# Patient Record
Sex: FEMALE | Race: WHITE | Employment: FULL TIME
[De-identification: names, ages, dates, MRNs, and addresses within clinical notes are randomized per-mention and may not be internally consistent; named-entity substitution may affect disease eponyms.]

---

## 2020-07-20 ENCOUNTER — NURSE TRIAGE (OUTPATIENT)
Dept: OTHER | Facility: CLINIC | Age: 25
End: 2020-07-20

## 2020-07-20 NOTE — TELEPHONE ENCOUNTER
Abdominal pain if she presses on the area. It is only with palpation that she feels the sensitivity. She noticed it last night when she was leaning against the sink while brushing her teeth. It is just to the right and up a little of the belly button. Reason for Disposition   Patient wants to be seen    Answer Assessment - Initial Assessment Questions  1. LOCATION: \"Where does it hurt? \"       Abdominal pain if she presses on the area. It is only with palpation that she feels the sensitivity. She noticed it last night when she was leaning against the sink while brushing her teeth. It is just to the right and up a little of the belly button. Still able to eat, drink and do daily activities. No change in BM. 2. RADIATION: \"Does the pain shoot anywhere else? \" (e.g., chest, back)      Denies    3. ONSET: \"When did the pain begin? \" (e.g., minutes, hours or days ago)       Noticed it last night    4. SUDDEN: \"Gradual or sudden onset?\"        5. PATTERN \"Does the pain come and go, or is it constant? \"     - If constant: \"Is it getting better, staying the same, or worsening? \"       (Note: Constant means the pain never goes away completely; most serious pain is constant and it progresses)      - If intermittent: \"How long does it last?\" \"Do you have pain now? \"      (Note: Intermittent means the pain goes away completely between bouts)      Only on palpation     6. SEVERITY: \"How bad is the pain? \"  (e.g., Scale 1-10; mild, moderate, or severe)     - MILD (1-3): doesn't interfere with normal activities, abdomen soft and not tender to touch      - MODERATE (4-7): interferes with normal activities or awakens from sleep, tender to touch      - SEVERE (8-10): excruciating pain, doubled over, unable to do any normal activities        5/10 on palpation only    7. RECURRENT SYMPTOM: \"Have you ever had this type of abdominal pain before? \" If so, ask: \"When was the last time? \" and \"What happened that time? \"       No    8. AGGRAVATING FACTORS: \"Does anything seem to cause this pain? \" (e.g., foods, stress, alcohol)      Touching it    9. CARDIAC SYMPTOMS: \"Do you have any of the following symptoms: chest pain, difficulty breathing, sweating, nausea? \"      Denies    10. OTHER SYMPTOMS: \"Do you have any other symptoms? \" (e.g., fever, vomiting, diarrhea)        Denies    11. PREGNANCY: \"Is there any chance you are pregnant? \" \"When was your last menstrual period? \"        No    Protocols used: ABDOMINAL PAIN - UPPER-ADULT-OH    Pod 1    Was calling to get set up/ established with a doctor or NP in the office. Received call from [facility]. Attempted soft transferred to [facility] to schedule appointment. Message sent via message board. Please do not reply to the triage nurse through this encounter. Any subsequent communication should be directly with the patient.

## 2020-07-27 ENCOUNTER — OFFICE VISIT (OUTPATIENT)
Dept: PRIMARY CARE CLINIC | Age: 25
End: 2020-07-27
Payer: COMMERCIAL

## 2020-07-27 VITALS
DIASTOLIC BLOOD PRESSURE: 76 MMHG | OXYGEN SATURATION: 97 % | HEIGHT: 63 IN | TEMPERATURE: 97.9 F | BODY MASS INDEX: 41.25 KG/M2 | HEART RATE: 94 BPM | SYSTOLIC BLOOD PRESSURE: 118 MMHG | WEIGHT: 232.8 LBS

## 2020-07-27 PROCEDURE — 90472 IMMUNIZATION ADMIN EACH ADD: CPT | Performed by: PHYSICIAN ASSISTANT

## 2020-07-27 PROCEDURE — G8417 CALC BMI ABV UP PARAM F/U: HCPCS | Performed by: PHYSICIAN ASSISTANT

## 2020-07-27 PROCEDURE — 99203 OFFICE O/P NEW LOW 30 MIN: CPT | Performed by: PHYSICIAN ASSISTANT

## 2020-07-27 PROCEDURE — 1036F TOBACCO NON-USER: CPT | Performed by: PHYSICIAN ASSISTANT

## 2020-07-27 PROCEDURE — 90471 IMMUNIZATION ADMIN: CPT | Performed by: PHYSICIAN ASSISTANT

## 2020-07-27 PROCEDURE — G8427 DOCREV CUR MEDS BY ELIG CLIN: HCPCS | Performed by: PHYSICIAN ASSISTANT

## 2020-07-27 PROCEDURE — 90715 TDAP VACCINE 7 YRS/> IM: CPT | Performed by: PHYSICIAN ASSISTANT

## 2020-07-27 PROCEDURE — 90651 9VHPV VACCINE 2/3 DOSE IM: CPT | Performed by: PHYSICIAN ASSISTANT

## 2020-07-27 RX ORDER — SUMATRIPTAN 25 MG/1
25 TABLET, FILM COATED ORAL
Qty: 9 TABLET | Refills: 3 | Status: SHIPPED | OUTPATIENT
Start: 2020-07-27 | End: 2020-08-31

## 2020-07-27 SDOH — HEALTH STABILITY: MENTAL HEALTH: HOW OFTEN DO YOU HAVE A DRINK CONTAINING ALCOHOL?: NEVER

## 2020-07-27 ASSESSMENT — ENCOUNTER SYMPTOMS: SHORTNESS OF BREATH: 0

## 2020-07-27 NOTE — PROGRESS NOTES
[facility] PRIMARY CARE  [address] [place] [address]  Dept: [phone]    Gagan Wilson is a 22 y.o. female who presents today as an new patient for her medical conditionsas noted below. Chief Complaint   Patient presents with    New Patient     get established.  Mass     Pt feels a lump on Right lower abdomen - does have pain with palpation    Headache     Pt gets headaches - feels pain and pressure on and around nose area - reports headaches 3-4 times a week. HPI:     HPI   Has not had a  PCP; says her sister is a doctor. Denies chronic medical problems. HM:  -Pt reports she has had 1 normal pap smear previously in 2018 with [facility]. Been on OCP since 2017. Does not usually have a periods, but when she has them they are heavy. Not spotting between them. Sexually active, but pt is engaged and denies concern for STDs.   -Says she had her childhood vaccines, but did not have Gardasil and she is interested in receiving that today. Noticed lump in right abdomen last Saturday when she leaned against the sink. Hurts when she pushes on it and she feels a stinging sensation afterwards. Occasional sharp pain briefly even when she does not push on it. No N/V. No Diarrhea. No constipation-BM daily. No blood in stool. No hx of lipomas. No fever. Had HAs for years, but they are getting more frequent the last couple months (3-4x per week). Occur around the bridge of her nose. Denies any nasal congestion. Says twice she has had an aura with the HAs- saw a blurry Jackson in her vision prior to the HA starting. Majority of the HAs are located on the left vertex. Gets nausea, no vomiting. Light sensitivity, but not sound. No associated weakness, or n/t or speech issues. Sometime they last all day, sometimes over 24 hours. Ibuprofen 800 mg usually helps. Mother has migraines.    Pt does not drink any caffeine regularly. Does not eat meat, does not drink wine. Gets adequate sleep. Does grind her teeth, but wears a mouth guard. +clenches. Work is stressful. Usually does dance for stress relief, but it is not currently open with COVID. No results found for: LDLCHOLESTEROL, LDLCALC    (goal LDL is <100)   No results found for: AST, ALT, BUN  BP Readings from Last 3 Encounters:   07/27/20 118/76          (goal 120/80)    Past Medical History:   Diagnosis Date    Headache       Past Surgical History:   Procedure Laterality Date    WISDOM TOOTH EXTRACTION  2015       Family History   Problem Relation Age of Onset    Diabetes Mother     High Blood Pressure Mother    [facility] Migraines Mother     Diabetes Father     High Blood Pressure Father     Asthma Sister     High Blood Pressure Brother     Breast Cancer Maternal Grandmother     Heart Attack Maternal Grandfather     High Blood Pressure Paternal Grandmother     Diabetes Paternal Grandmother     High Blood Pressure Paternal Grandfather     Diabetes Paternal Grandfather        Social History     Tobacco Use    Smoking status: Never Smoker    Smokeless tobacco: Never Used   Substance Use Topics    Alcohol use: Never     Frequency: Never      Current Outpatient Medications   Medication Sig Dispense Refill    Norethin Ace-Eth Estrad-FE (LEATHA FE 1/20 PO) Take by mouth      SUMAtriptan (IMITREX) 25 MG tablet Take 1 tablet by mouth once as needed for Migraine May repeat once in 2 hours if needed 9 tablet 3     No current facility-administered medications for this visit.       Allergies   Allergen Reactions    Bee Venom Anaphylaxis       Health Maintenance   Topic Date Due    HIV screen  01/13/2010    HPV vaccine (2 - 3-dose series) 08/24/2020    Flu vaccine (1) 09/01/2020    Cervical cancer screen  10/29/2021    DTaP/Tdap/Td vaccine (7 - Td) 07/27/2030    Hepatitis A vaccine  Completed    Hepatitis B vaccine  Completed    Hib vaccine  Completed  Varicella vaccine  Completed    Meningococcal (ACWY) vaccine  Aged Out    Pneumococcal 0-64 years Vaccine  Aged Out       Subjective:     Review of Systems   Constitutional: Negative for fever. HENT: Negative for congestion. Eyes: Positive for photophobia and visual disturbance (twice as aura prior to HA). Respiratory: Negative for shortness of breath. Cardiovascular: Negative for chest pain. Gastrointestinal: Positive for abdominal pain (near mass) and nausea (only with HAs). Negative for blood in stool, constipation, diarrhea and vomiting. Neurological: Positive for light-headedness (with the HAs) and headaches. Negative for speech difficulty, weakness and numbness. Psychiatric/Behavioral: Negative for sleep disturbance. Objective:     /76   Pulse 94   Temp 97.9 °F (36.6 °C)   Ht 5' 3\" (1.6 m)   Wt 232 lb 12.8 oz (105.6 kg)   SpO2 97%   BMI 41.24 kg/m²   Physical Exam  Vitals signs and nursing note reviewed. Constitutional:       Appearance: Normal appearance. HENT:      Head: Normocephalic and atraumatic. Eyes:      Extraocular Movements: Extraocular movements intact. Pupils: Pupils are equal, round, and reactive to light. Cardiovascular:      Rate and Rhythm: Normal rate and regular rhythm. Pulmonary:      Effort: Pulmonary effort is normal.      Breath sounds: Normal breath sounds. Abdominal:      General: Bowel sounds are normal. There is no distension. Palpations: There is mass. Tenderness: There is abdominal tenderness. There is no guarding or rebound. Neurological:      Mental Status: She is alert. Cranial Nerves: No cranial nerve deficit. Assessment:       Diagnosis Orders   1. Right upper quadrant abdominal mass  CT ABDOMEN PELVIS W IV CONTRAST Additional Contrast? Radiologist Recommendation   2. Migraine with aura and without status migrainosus, not intractable  SUMAtriptan (IMITREX) 25 MG tablet   3.  Need for vaccination  HPV Vaccine 9-valent IM    Tdap (age 10y-63y) IM (ADACEL)        Plan:     1. RUQ abdominal mass- Did not seem to reduce with pressure or increase with cough to suggest a hernia. Ordered abdominal CT to characterize the mass. 2. Migraines- Trial of imitrex for migraine . Try to find stress outlet, such as doing her dance routines at home, etc.     3. Given 1st HPV vaccine and Tdap updated today. Return in about 1 month (around 2020) for migraines and 2nd HPV vaccine. Orders Placed This Encounter   Procedures    CT ABDOMEN PELVIS W IV CONTRAST Additional Contrast? Radiologist Recommendation     Standing Status:   Future     Standing Expiration Date:   2021     Order Specific Question:   Additional Contrast?     Answer:   Radiologist Recommendation    HPV Vaccine 9-valent IM     Patient must lay down for 10 minutes after the injection    Tdap (age 10y-63y) IM (ADACEL)     Orders Placed This Encounter   Medications    SUMAtriptan (IMITREX) 25 MG tablet     Sig: Take 1 tablet by mouth once as needed for Migraine May repeat once in 2 hours if needed     Dispense:  9 tablet     Refill:  3       Patient given educationalmaterials - see patient instructions. Discussed use, benefit, and side effectsof prescribed medications. All patient questions answered. Pt voiced understanding. Reviewed health maintenance. Instructed to continue current medications, diet andexercise. Patient agreed with treatment plan. Follow up as directed.      Electronicallysigned by Moraima Rice PA-C on 2020 at 10:39 AM

## 2020-07-27 NOTE — PATIENT INSTRUCTIONS
Patient Education        Recurring Migraine Headache: Care Instructions  Your Care Instructions  Migraines are painful, throbbing headaches. They often start on one side of the head. They may cause nausea and vomiting and make you sensitive to light, sound, or smell. Some people may have only a few migraines throughout life. Others have them as often as several times a month. The goal of treatment is to reduce the number of migraines you have and relieve your symptoms. Even with treatment, you may continue to have migraines. You play an important role in dealing with your headaches. Work on avoiding things that seem to trigger your migraines. When you feel a headache coming on, act quickly to stop it before it gets worse. Follow-up care is a key part of your treatment and safety. Be sure to make and go to all appointments, and call your doctor if you are having problems. It's also a good idea to know your test results and keep a list of the medicines you take. How can you care for yourself at home? · Do not drive if you have taken a prescription pain medicine. · Rest in a quiet, dark room until your headache is gone. Close your eyes and try to relax or go to sleep. Do not watch TV or read. · Put a cold, moist cloth or cold pack on the painful area for 10 to 20 minutes at a time. Put a thin cloth between the cold pack and your skin. · Have someone gently massage your neck and shoulders. · Take your medicines exactly as prescribed. Call your doctor if you think you are having a problem with your medicine. You will get more details on the specific medicines your doctor prescribes. · Don't take medicine for headache pain too often. Talk to your doctor if you are taking medicine more than 2 days a week to stop a headache. Taking too much pain medicine can lead to more headaches. These are called medicine-overuse headaches.   To prevent migraines  · Keep a headache diary so you can figure out what triggers your headaches. Avoiding triggers may help you prevent headaches. Record when each headache began, how long it lasted, and what the pain was like. Write down any other symptoms you had with the headache. These may include nausea, flashing lights or dark spots, or sensitivity to bright light or loud noise. Note if the headache occurred near your period. List anything that might have triggered the headache. Triggers may include certain foods (chocolate, cheese, wine) or odors, smoke, bright light, stress, or lack of sleep. · If your doctor has prescribed medicine for your migraines, take it as directed. You may have medicine that you take only when you get a migraine and medicine that you take all the time to help prevent migraines. ? If your doctor has prescribed medicine for when you get a headache, take it at the first sign of a migraine, unless your doctor has given you other instructions. ? If your doctor has prescribed medicine to prevent migraines, take it exactly as prescribed. Call your doctor if you think you are having a problem with your medicine. · Find healthy ways to deal with stress. Migraines are most common during or right after stressful times. Try finding ways to reduce stress like practicing mindfulness or deep breathing exercises. · Get regular sleep and exercise. But be careful to not push yourself too hard during exercise. It may trigger a headache. · Eat regular meals, and avoid foods and drinks that often trigger migraines. These include chocolate and alcohol, especially red wine and port. Chemicals used in food, such as aspartame and monosodium glutamate (MSG), also can trigger migraines. So can some food additives, such as those found in hot dogs, vega, cold cuts, aged cheeses, and pickled foods. · Limit caffeine by not drinking too much coffee, tea, or soda. Do not quit caffeine suddenly, because that can also give you migraines. · Do not smoke or allow others to smoke around you.  If you need help quitting, talk to your doctor about stop-smoking programs and medicines. These can increase your chances of quitting for good. · If you are taking birth control pills or hormone therapy, talk to your doctor about whether they are triggering your migraines. When should you call for help? [id number] anytime you think you may need emergency care. For example, call if:  · You have symptoms of a stroke. These may include:  ? Sudden numbness, tingling, weakness, or loss of movement in your face, arm, or leg, especially on only one side of your body. ? Sudden vision changes. ? Sudden trouble speaking. ? Sudden confusion or trouble understanding simple statements. ? Sudden problems with walking or balance. ? A sudden, severe headache that is different from past headaches. Call your doctor now or seek immediate medical care if:  · You develop a fever and a stiff neck. · You have new nausea and vomiting, or you cannot keep down food or liquids. Watch closely for changes in your health, and be sure to contact your doctor if:  · You have a headache that does not get better within 1 or 2 days. · Your headaches get worse or happen more often. Where can you learn more? Go to https://[employer] AppspepicOptionEase.Weekdone. org and sign in to your fotobabble account. Enter  in the [employer] box to learn more about \"Recurring Migraine Headache: Care Instructions. \"     If you do not have an account, please click on the \"Sign Up Now\" link. Current as of: November 20, 2019               Content Version: 12.5  © 1123-9658 Healthwise, Incorporated. Care instructions adapted under license by [facility] ([facility]). If you have questions about a medical condition or this instruction, always ask your healthcare professional. [place] [address] any warranty or liability for your use of this information.

## 2020-07-28 ENCOUNTER — APPOINTMENT (OUTPATIENT)
Dept: CT IMAGING | Age: 25
End: 2020-07-28
Payer: COMMERCIAL

## 2020-07-28 ENCOUNTER — HOSPITAL ENCOUNTER (EMERGENCY)
Age: 25
Discharge: HOME | End: 2020-07-28
Attending: EMERGENCY MEDICINE
Payer: COMMERCIAL

## 2020-07-28 VITALS
OXYGEN SATURATION: 96 % | HEIGHT: 63 IN | DIASTOLIC BLOOD PRESSURE: 98 MMHG | WEIGHT: 231 LBS | BODY MASS INDEX: 40.93 KG/M2 | RESPIRATION RATE: 14 BRPM | HEART RATE: 123 BPM | SYSTOLIC BLOOD PRESSURE: 154 MMHG | TEMPERATURE: 98.2 F

## 2020-07-28 LAB
ABSOLUTE EOS #: 0.04 K/UL (ref 0–0.44)
ABSOLUTE IMMATURE GRANULOCYTE: 0.02 K/UL (ref 0–0.3)
ABSOLUTE LYMPH #: 1.86 K/UL (ref 1.1–3.7)
ABSOLUTE MONO #: 0.58 K/UL (ref 0.1–1.2)
ALBUMIN SERPL-MCNC: 4.3 G/DL (ref 3.5–5.2)
ALBUMIN/GLOBULIN RATIO: ABNORMAL (ref 1–2.5)
ALP BLD-CCNC: 75 U/L (ref 35–104)
ALT SERPL-CCNC: 21 U/L (ref 5–33)
ANION GAP SERPL CALCULATED.3IONS-SCNC: 13 MMOL/L (ref 9–17)
AST SERPL-CCNC: 23 U/L
BASOPHILS # BLD: 1 % (ref 0–2)
BASOPHILS ABSOLUTE: 0.03 K/UL (ref 0–0.2)
BILIRUB SERPL-MCNC: 0.22 MG/DL (ref 0.3–1.2)
BUN BLDV-MCNC: 10 MG/DL (ref 6–20)
BUN/CREAT BLD: 12 (ref 9–20)
CALCIUM SERPL-MCNC: 9.2 MG/DL (ref 8.6–10.4)
CHLORIDE BLD-SCNC: 101 MMOL/L (ref 98–107)
CO2: 23 MMOL/L (ref 20–31)
CREAT SERPL-MCNC: 0.81 MG/DL (ref 0.5–0.9)
DIFFERENTIAL TYPE: NORMAL
EOSINOPHILS RELATIVE PERCENT: 1 % (ref 1–4)
GFR AFRICAN AMERICAN: >60 ML/MIN
GFR NON-AFRICAN AMERICAN: >60 ML/MIN
GFR SERPL CREATININE-BSD FRML MDRD: ABNORMAL ML/MIN/{1.73_M2}
GFR SERPL CREATININE-BSD FRML MDRD: ABNORMAL ML/MIN/{1.73_M2}
GLUCOSE BLD-MCNC: 89 MG/DL (ref 70–99)
HCG QUALITATIVE: NEGATIVE
HCT VFR BLD CALC: 39.3 % (ref 36.3–47.1)
HEMOGLOBIN: 13.4 G/DL (ref 11.9–15.1)
IMMATURE GRANULOCYTES: 0 %
LIPASE: 16 U/L (ref 13–60)
LYMPHOCYTES # BLD: 30 % (ref 24–43)
MCH RBC QN AUTO: 29.6 PG (ref 25.2–33.5)
MCHC RBC AUTO-ENTMCNC: 34.1 G/DL (ref 28.4–34.8)
MCV RBC AUTO: 86.9 FL (ref 82.6–102.9)
MONOCYTES # BLD: 9 % (ref 3–12)
NRBC AUTOMATED: 0 PER 100 WBC
PDW BLD-RTO: 12 % (ref 11.8–14.4)
PLATELET # BLD: 340 K/UL (ref 138–453)
PLATELET ESTIMATE: NORMAL
PMV BLD AUTO: 8.9 FL (ref 8.1–13.5)
POTASSIUM SERPL-SCNC: 3.7 MMOL/L (ref 3.7–5.3)
RBC # BLD: 4.52 M/UL (ref 3.95–5.11)
RBC # BLD: NORMAL 10*6/UL
SEG NEUTROPHILS: 59 % (ref 36–65)
SEGMENTED NEUTROPHILS ABSOLUTE COUNT: 3.78 K/UL (ref 1.5–8.1)
SODIUM BLD-SCNC: 137 MMOL/L (ref 135–144)
TOTAL PROTEIN: 7.8 G/DL (ref 6.4–8.3)
WBC # BLD: 6.3 K/UL (ref 3.5–11.3)
WBC # BLD: NORMAL 10*3/UL

## 2020-07-28 PROCEDURE — 85025 COMPLETE CBC W/AUTO DIFF WBC: CPT

## 2020-07-28 PROCEDURE — 2580000003 HC RX 258: Performed by: EMERGENCY MEDICINE

## 2020-07-28 PROCEDURE — 81003 URINALYSIS AUTO W/O SCOPE: CPT

## 2020-07-28 PROCEDURE — 99284 EMERGENCY DEPT VISIT MOD MDM: CPT

## 2020-07-28 PROCEDURE — 84703 CHORIONIC GONADOTROPIN ASSAY: CPT

## 2020-07-28 PROCEDURE — 80053 COMPREHEN METABOLIC PANEL: CPT

## 2020-07-28 PROCEDURE — 83690 ASSAY OF LIPASE: CPT

## 2020-07-28 PROCEDURE — 81025 URINE PREGNANCY TEST: CPT

## 2020-07-28 PROCEDURE — 6360000004 HC RX CONTRAST MEDICATION: Performed by: EMERGENCY MEDICINE

## 2020-07-28 PROCEDURE — 74177 CT ABD & PELVIS W/CONTRAST: CPT

## 2020-07-28 RX ORDER — ONDANSETRON 4 MG/1
4 TABLET, ORALLY DISINTEGRATING ORAL
Qty: 20 TABLET | Refills: 0 | Status: SHIPPED | OUTPATIENT
Start: 2020-07-28 | End: 2020-08-31

## 2020-07-28 RX ORDER — PANTOPRAZOLE SODIUM 20 MG/1
40 TABLET, DELAYED RELEASE ORAL DAILY
Qty: 30 TABLET | Refills: 0 | Status: SHIPPED | OUTPATIENT
Start: 2020-07-28 | End: 2020-08-31

## 2020-07-28 RX ORDER — OXYCODONE HYDROCHLORIDE AND ACETAMINOPHEN 5; 325 MG/1; MG/1
1 TABLET ORAL EVERY 4 HOURS PRN
Qty: 20 TABLET | Refills: 0 | Status: SHIPPED | OUTPATIENT
Start: 2020-07-28 | End: 2020-07-31

## 2020-07-28 RX ORDER — 0.9 % SODIUM CHLORIDE 0.9 %
1000 INTRAVENOUS SOLUTION INTRAVENOUS ONCE
Status: COMPLETED | OUTPATIENT
Start: 2020-07-28 | End: 2020-07-28

## 2020-07-28 RX ORDER — SODIUM CHLORIDE 0.9 % (FLUSH) 0.9 %
10 SYRINGE (ML) INJECTION PRN
Status: DISCONTINUED | OUTPATIENT
Start: 2020-07-28 | End: 2020-07-29 | Stop reason: HOSPADM

## 2020-07-28 RX ORDER — 0.9 % SODIUM CHLORIDE 0.9 %
80 INTRAVENOUS SOLUTION INTRAVENOUS ONCE
Status: COMPLETED | OUTPATIENT
Start: 2020-07-28 | End: 2020-07-28

## 2020-07-28 RX ADMIN — IOPAMIDOL 75 ML: 755 INJECTION, SOLUTION INTRAVENOUS at 22:47

## 2020-07-28 RX ADMIN — Medication 10 ML: at 22:47

## 2020-07-28 RX ADMIN — SODIUM CHLORIDE 80 ML: 9 INJECTION, SOLUTION INTRAVENOUS at 22:47

## 2020-07-28 RX ADMIN — SODIUM CHLORIDE 1000 ML: 9 INJECTION, SOLUTION INTRAVENOUS at 21:54

## 2020-07-28 SDOH — HEALTH STABILITY: MENTAL HEALTH: HOW OFTEN DO YOU HAVE A DRINK CONTAINING ALCOHOL?: NEVER

## 2020-07-28 ASSESSMENT — PAIN SCALES - GENERAL: PAINLEVEL_OUTOF10: 5

## 2020-07-28 ASSESSMENT — ENCOUNTER SYMPTOMS
CONSTIPATION: 0
NAUSEA: 0
ABDOMINAL DISTENTION: 1
SHORTNESS OF BREATH: 0
VOMITING: 0
DIARRHEA: 0
TROUBLE SWALLOWING: 0
ABDOMINAL PAIN: 1
BLOOD IN STOOL: 0

## 2020-07-29 LAB — HCG, PREGNANCY URINE (POC): NEGATIVE

## 2020-07-29 NOTE — ED PROVIDER NOTES
[facility] ED  eMERGENCY dEPARTMENT eNCOUnter      Pt Name: Johny Jenkins  MRN: [id number]  Ashishgfnelson 1995  Date of evaluation: 7/28/2020  Provider: Deja Wesley MD    CHIEF COMPLAINT       Chief Complaint   Patient presents with    Abdominal Pain       Care is turned over to me at shift change by Dr. Ton Braxton. I am asked to follow-up on results of remaining investigations, additional care and ultimate disposition. DIAGNOSTIC RESULTS         RADIOLOGY:   Non-plain film images such as CT, Ultrasound and MRI are read by the radiologist. Plain radiographic images are visualized and preliminarily interpreted by the emergency physician with the below findings:    CT ABDOMEN PELVIS W IV CONTRAST Additional Contrast? None   Status: Final result    Order Providers     Authorizing  Billing    MD Dk Holden MD            Signed by     Signed  Date/Time   Phone  Pager    Irvinalessandro Pierson  7/28/2020  23:02  [phone]     Reading Providers     Read  Date  Phone  Pager    Irvin Dangelo  Jul 28, 2020  [phone]     Radiation Dose Estimates     No radiation information found for this patient    Narrative    EXAMINATION:    CT OF THE ABDOMEN AND PELVIS WITH CONTRAST 7/28/2020 10:37 pm         TECHNIQUE:    CT of the abdomen and pelvis was performed with the administration of    intravenous contrast. Multiplanar reformatted images are provided for review.     Dose modulation, iterative reconstruction, and/or weight based adjustment of    the mA/kV was utilized to reduce the radiation dose to as low as reasonably    achievable.         COMPARISON:    None.         HISTORY:    ORDERING SYSTEM PROVIDED HISTORY: tenderness RUQ as well as concern for    hernia/mass    TECHNOLOGIST PROVIDED HISTORY:    tenderness RUQ as well as concern for hernia/mass         Reason for Exam: ruq pain    Acuity: Acute    Type of Exam: Initial    Additional signs and symptoms: palpable lump in ruq area      FINDINGS:    Lower Chest: No acute abnormality in the included lung bases.         Organs: Accessory splenule is present.  Spleen is otherwise unremarkable. Liver, gallbladder, pancreas, adrenals, and kidneys demonstrate no acute    abnormality.  No urinary obstruction is evident.         GI/Bowel: No free fluid, free air, bowel obstruction or bowel wall thickening    is evident.         Pelvis: Appendix is visualized.  No evidence of appendicitis.  Bladder is    unremarkable.  No abnormal fluid collections, pelvic or inguinal adenopathy    is noted.         Peritoneum/Retroperitoneum: No aortic aneurysm, retroperitoneal mass,    retroperitoneal or mesenteric adenopathy is seen.  Shotty right lower    quadrant lymph nodes are present.         Bones/Soft Tissues: No acute osseous abnormality.  No acute soft tissue    abnormality.              Impression    Shotty right lower quadrant lymph nodes which may be related to mesenteric    adenitis.  Otherwise, no acute abdominopelvic process.  No gallstones, bowel    obstruction, appendicitis or urinary obstruction is evident.               Interpretation per the Radiologist below, if available at the time of this note:    CT ABDOMEN PELVIS W IV CONTRAST Additional Contrast? None   Final Result   Shotty right lower quadrant lymph nodes which may be related to mesenteric   adenitis. Otherwise, no acute abdominopelvic process. No gallstones, bowel   obstruction, appendicitis or urinary obstruction is evident. LABS:  Labs Reviewed   COMPREHENSIVE METABOLIC PANEL - Abnormal; Notable for the following components:       Result Value    Total Bilirubin 0.22 (*)     All other components within normal limits   CBC WITH AUTO DIFFERENTIAL   LIPASE   HCG, SERUM, QUALITATIVE       All other labs were within normal range or not returned as of this dictation.     EMERGENCY DEPARTMENT COURSE and DIFFERENTIAL DIAGNOSIS/MDM:   Vitals:    Vitals:    07/28/20 1834 07/28/20 1835   BP: (!) 154/98    Pulse: 123    Resp: 14    Temp: 98.2 °F (36.8 °C)    SpO2: 96%    Weight:  231 lb (104.8 kg)   Height:  5' 3\" (1.6 m)     Patient's investigations are reviewed. Her CT returns without abnormality. No acute abdominal pathology is noted. Patient will be discharged home on symptomatic management. She is advised further follow-up and reevaluation with her family physician for additional investigations as indicated    CONSULTS:  None    PROCEDURES:  None    FINAL IMPRESSION      1. Right upper quadrant abdominal pain          DISPOSITION/PLAN   DISPOSITION Decision To Discharge 07/28/2020 11:11:24 PM      PATIENT REFERRED TO:   Jose Luis    Schedule an appointment as soon as possible for a visit       [facility] ED  [address]  [phone]    As needed, If symptoms worsen      DISCHARGE MEDICATIONS:     New Prescriptions    ONDANSETRON (ZOFRAN ODT) 4 MG DISINTEGRATING TABLET    Take 1 tablet by mouth every 4-6 hours as needed for Nausea    OXYCODONE-ACETAMINOPHEN (PERCOCET) 5-325 MG PER TABLET    Take 1 tablet by mouth every 4 hours as needed for Pain for up to 3 days.     PANTOPRAZOLE (PROTONIX) 20 MG TABLET    Take 2 tablets by mouth daily           (Please note that portions of this note were completed with a voice recognition program.  Efforts were made to edit the dictations but occasionally words are mis-transcribed.)    Pranav Azevedo MD  Attending Emergency Physician          Pranav Azevedo MD  07/28/20 5414

## 2020-07-29 NOTE — ED PROVIDER NOTES
EMERGENCY DEPARTMENT ENCOUNTER    Pt Name: Alejandro Leggett  MRN: [id number]  [place] 1995  Date of evaluation: 7/28/20  CHIEF COMPLAINT       Chief Complaint   Patient presents with    Abdominal Pain     HISTORY OF PRESENT ILLNESS   Patient is a 80-year-old female here with pain in bulging to the right upper quadrant area. Patient was seen by her PCP as this is been going on for about 1 week getting larger and more painful. She states that her PCP did order a CT for this Friday but told her if it gets worse to come in for evaluation. She denies nausea vomiting fevers chills chest pain shortness of breath states she had a mild headache no neck pain or stiffness no prior gallbladder pancreas problems or ulcers. States she has had normal bowel movements no dysuria hematuria vaginal bleeding or discharge or lower abdominal tenderness. No prior abdominal surgeries. Patient states she only really notices the bulge when she stands up. REVIEW OF SYSTEMS     Review of Systems   Constitutional: Negative for chills and fever. HENT: Negative for trouble swallowing. Eyes: Negative for visual disturbance. Respiratory: Negative for shortness of breath. Cardiovascular: Negative for chest pain. Gastrointestinal: Positive for abdominal distention and abdominal pain. Negative for blood in stool, constipation, diarrhea, nausea and vomiting. Genitourinary: Negative for difficulty urinating, dysuria, vaginal bleeding and vaginal discharge. Musculoskeletal: Negative for neck pain. Skin: Negative for rash. Neurological: Negative for weakness. Psychiatric/Behavioral: Negative for confusion. PASTMEDICAL HISTORY   History reviewed. No pertinent past medical history. SURGICAL HISTORY     History reviewed. No pertinent surgical history. CURRENT MEDICATIONS       Discharge Medication List as of 7/28/2020 11:12 PM        ALLERGIES     has No Known Allergies.   FAMILY HISTORY     has no family status information on file. SOCIAL HISTORY       Social History     Tobacco Use    Smoking status: Never Smoker    Smokeless tobacco: Never Used   Substance Use Topics    Alcohol use: Never     Frequency: Never    Drug use: Not on file     PHYSICAL EXAM     INITIAL VITALS: BP (!) 154/98   Pulse 123   Temp 98.2 °F (36.8 °C)   Resp 14   Ht 5' 3\" (1.6 m)   Wt 231 lb (104.8 kg)   LMP 07/26/2020   SpO2 96%   BMI 40.92 kg/m²    Physical Exam  Vitals signs and nursing note reviewed. Constitutional:       General: She is not in acute distress. Appearance: She is not ill-appearing, toxic-appearing or diaphoretic. HENT:      Head: Normocephalic and atraumatic. Mouth/Throat:      Mouth: Mucous membranes are moist.      Pharynx: Oropharynx is clear. Eyes:      Extraocular Movements: Extraocular movements intact. Pupils: Pupils are equal, round, and reactive to light. Neck:      Musculoskeletal: Normal range of motion and neck supple. Cardiovascular:      Rate and Rhythm: Regular rhythm. Tachycardia present. Pulses: Normal pulses. Pulmonary:      Effort: Pulmonary effort is normal. No respiratory distress. Abdominal:      General: There is no distension or abdominal bruit. Palpations: Abdomen is soft. There is mass. There is no pulsatile mass. Tenderness: There is abdominal tenderness. There is no right CVA tenderness, left CVA tenderness, guarding or rebound. Negative signs include Persaud's sign and McBurney's sign. Hernia: No hernia is present. Comments: Patient seems to have possible hernia to the right anterior area does not seem reducible there is no real tenderness no overlying skin changes   Musculoskeletal: Normal range of motion. General: No deformity. Right lower leg: No edema. Left lower leg: No edema. Skin:     General: Skin is warm and dry. Capillary Refill: Capillary refill takes less than 2 seconds.    Neurological: General: No focal deficit present. Mental Status: She is alert and oriented to person, place, and time. Psychiatric:         Thought Content: Thought content normal.         MEDICAL DECISION MAKING:          Please see ED Course below for MDM/ED course. DDx: Hernia, cholecystitis, pancreatitis    All patient's question's and concerns were answered prior to disposition and patient and/or family expressed understanding and agreement of treatment plan. NIH STROKE SCALE:            PROCEDURES:    Procedures    DIAGNOSTIC RESULTS   EKG:All EKG's are interpreted by the Emergency Department Physician who either signs or Co-signs this chart in the absence of a cardiologist.      RADIOLOGY:All plain film, CT, MRI, and formal ultrasound images (except ED bedside ultrasound) are read by the radiologist, see reports below, unless otherwisenoted in MDM or here. CT ABDOMEN PELVIS W IV CONTRAST Additional Contrast? None   Final Result   Shotty right lower quadrant lymph nodes which may be related to mesenteric   adenitis. Otherwise, no acute abdominopelvic process. No gallstones, bowel   obstruction, appendicitis or urinary obstruction is evident. LABS: All lab results were reviewed by myself, and all abnormals are listed below. Labs Reviewed   COMPREHENSIVE METABOLIC PANEL - Abnormal; Notable for the following components:       Result Value    Total Bilirubin 0.22 (*)     All other components within normal limits   CBC WITH AUTO DIFFERENTIAL   LIPASE   HCG, SERUM, QUALITATIVE   POCT URINE PREGNANCY       EMERGENCY DEPARTMENTCOURSE:     Patient is a 22-year-old female here with a right upper quadrant pain and bulging. Had outpatient CT ordered by PCP was instructed to come here if symptoms got worse than they have.   On exam no distress she is tachycardic afebrile nontoxic resting comfortably in bed mucous membranes moist normal capillary refill abdomen soft no real bulge appreciated when lying flat but when she does stand up does seem to have a possible hernia there is no overlying skin changes no real tenderness no pulsatile mass no rebound or guarding negative Persaud sign no lower abdominal tenderness. Will get CT scan check labs pregnancy test lipase, hydrate and reassess. Care was turned over to Dr. Cecille Queen, pending CT a/p, anticipate discharge if no acute pathology. Vitals:    Vitals:    07/28/20 1834 07/28/20 1835   BP: (!) 154/98    Pulse: 123    Resp: 14    Temp: 98.2 °F (36.8 °C)    SpO2: 96%    Weight:  231 lb (104.8 kg)   Height:  5' 3\" (1.6 m)       The patient was given the following medications while in the emergency department:  Orders Placed This Encounter   Medications    0.9 % sodium chloride bolus    0.9 % sodium chloride bolus    iopamidol (ISOVUE-370) 76 % injection 75 mL    DISCONTD: sodium chloride flush 0.9 % injection 10 mL    ondansetron (ZOFRAN ODT) 4 MG disintegrating tablet     Sig: Take 1 tablet by mouth every 4-6 hours as needed for Nausea     Dispense:  20 tablet     Refill:  0    pantoprazole (PROTONIX) 20 MG tablet     Sig: Take 2 tablets by mouth daily     Dispense:  30 tablet     Refill:  0    oxyCODONE-acetaminophen (PERCOCET) 5-325 MG per tablet     Sig: Take 1 tablet by mouth every 4 hours as needed for Pain for up to 3 days. Dispense:  20 tablet     Refill:  0     CONSULTS:  None    FINAL IMPRESSION      1.  Right upper quadrant abdominal pain          DISPOSITION/PLAN   DISPOSITION Decision To Discharge 07/28/2020 11:11:24 PM      PATIENT REFERRED TO:  Lori Roberts    Schedule an appointment as soon as possible for a visit       [facility]  [address] [facility]  [phone]    As needed, If symptoms worsen    DISCHARGE MEDICATIONS:  Discharge Medication List as of 7/28/2020 11:12 PM      START taking these medications    Details   ondansetron (ZOFRAN ODT) 4 MG disintegrating tablet Take 1 tablet by mouth every 4-6 hours as

## 2020-08-02 PROBLEM — G43.109 MIGRAINE WITH AURA AND WITHOUT STATUS MIGRAINOSUS, NOT INTRACTABLE: Status: ACTIVE | Noted: 2020-08-02

## 2020-08-02 PROBLEM — R19.01 RIGHT UPPER QUADRANT ABDOMINAL MASS: Status: ACTIVE | Noted: 2020-08-02

## 2020-08-02 ASSESSMENT — ENCOUNTER SYMPTOMS
DIARRHEA: 0
BLOOD IN STOOL: 0
NAUSEA: 1
PHOTOPHOBIA: 1
CONSTIPATION: 0
VOMITING: 0
ABDOMINAL PAIN: 1

## 2020-08-31 ENCOUNTER — OFFICE VISIT (OUTPATIENT)
Dept: PRIMARY CARE CLINIC | Age: 25
End: 2020-08-31
Payer: COMMERCIAL

## 2020-08-31 VITALS
DIASTOLIC BLOOD PRESSURE: 78 MMHG | TEMPERATURE: 97.1 F | WEIGHT: 233.4 LBS | SYSTOLIC BLOOD PRESSURE: 120 MMHG | BODY MASS INDEX: 41.34 KG/M2

## 2020-08-31 PROCEDURE — G8417 CALC BMI ABV UP PARAM F/U: HCPCS | Performed by: PHYSICIAN ASSISTANT

## 2020-08-31 PROCEDURE — G8427 DOCREV CUR MEDS BY ELIG CLIN: HCPCS | Performed by: PHYSICIAN ASSISTANT

## 2020-08-31 PROCEDURE — 90651 9VHPV VACCINE 2/3 DOSE IM: CPT | Performed by: PHYSICIAN ASSISTANT

## 2020-08-31 PROCEDURE — 99213 OFFICE O/P EST LOW 20 MIN: CPT | Performed by: PHYSICIAN ASSISTANT

## 2020-08-31 PROCEDURE — 1036F TOBACCO NON-USER: CPT | Performed by: PHYSICIAN ASSISTANT

## 2020-08-31 PROCEDURE — 90471 IMMUNIZATION ADMIN: CPT | Performed by: PHYSICIAN ASSISTANT

## 2020-08-31 ASSESSMENT — ENCOUNTER SYMPTOMS
ABDOMINAL PAIN: 1
DIARRHEA: 0
VOMITING: 0
NAUSEA: 0

## 2020-08-31 NOTE — PROGRESS NOTES
[facility]  [address] [facility] [address]  Dept: [phone]    Vazquez Gupta is a 22 y.o. female who presents today for her medical conditions/complaintsas noted below. Chief Complaint   Patient presents with    Migraine     1 MONTH F/U: pt feels better - has only had 3 headaches. HPI:     HPI   Only had 3 migraines since last appt. Tried the imitrex on 7/28 when she got a migraine and it was better when she woke up. The other 2 migraines resolved with OTC ibuprofen. Pt is still more concerned with the tender area in the RUQ. Went to the ED for it on 7/27/20. Reviewed abdominal CT, which was normal other than shotty RLQ lymph nodes. Pt denies any recent GI illness for lymphadenopathy. No N/V. No post-prandial abdominal pain. Normal stool- no diarrhea. No urinary symptoms or abnormal vaginal discharge.        No results found for: LDLCHOLESTEROL, LDLCALC    (goal LDL is <100)   AST (U/L)   Date Value   07/28/2020 23     ALT (U/L)   Date Value   07/28/2020 21     BUN (mg/dL)   Date Value   07/28/2020 10     BP Readings from Last 3 Encounters:   08/31/20 120/78   07/28/20 (!) 154/98   07/27/20 118/76          (goal 120/80)    Past Medical History:   Diagnosis Date    Headache       Past Surgical History:   Procedure Laterality Date    WISDOM TOOTH EXTRACTION  2015       Family History   Problem Relation Age of Onset    Diabetes Mother     High Blood Pressure Mother    [facility] Migraines Mother     Diabetes Father     High Blood Pressure Father     Asthma Sister     High Blood Pressure Brother     Breast Cancer Maternal Grandmother     Heart Attack Maternal Grandfather     High Blood Pressure Paternal Grandmother     Diabetes Paternal Grandmother     High Blood Pressure Paternal Grandfather     Diabetes Paternal Grandfather        Social History     Tobacco Use    Smoking status: Never Smoker    Smokeless tobacco: Never Used   Substance Use Topics    Alcohol use: Never     Frequency: Never      Current Outpatient Medications   Medication Sig Dispense Refill    Norethin Ace-Eth Estrad-FE (LEATHA FE 1/20 PO) Take by mouth      SUMAtriptan (IMITREX) 25 MG tablet Take 1 tablet by mouth once as needed for Migraine May repeat once in 2 hours if needed 9 tablet 3     No current facility-administered medications for this visit. Allergies   Allergen Reactions    Bee Venom Anaphylaxis       Health Maintenance   Topic Date Due    HIV screen  01/13/2010    Flu vaccine (1) 09/01/2020    HPV vaccine (3 - 3-dose series) 01/27/2021    Cervical cancer screen  10/29/2021    DTaP/Tdap/Td vaccine (7 - Td) 07/27/2030    Hepatitis A vaccine  Completed    Hepatitis B vaccine  Completed    Hib vaccine  Completed    Varicella vaccine  Completed    Meningococcal (ACWY) vaccine  Aged Out    Pneumococcal 0-64 years Vaccine  Aged Out       Subjective:      Review of Systems   Constitutional: Negative for fever and unexpected weight change. Gastrointestinal: Positive for abdominal pain (tenderness). Negative for diarrhea, nausea and vomiting. Genitourinary: Negative for dysuria, frequency, urgency and vaginal discharge. Objective:     /78   Temp 97.1 °F (36.2 °C)   Wt 233 lb 6.4 oz (105.9 kg)   BMI 41.34 kg/m²   Physical Exam  Vitals signs and nursing note reviewed. Constitutional:       Appearance: Normal appearance. HENT:      Head: Normocephalic and atraumatic. Cardiovascular:      Rate and Rhythm: Normal rate and regular rhythm. Pulmonary:      Effort: Pulmonary effort is normal.      Breath sounds: Normal breath sounds. Abdominal:      General: Bowel sounds are normal. There is no distension. Palpations: Abdomen is soft. Tenderness: There is abdominal tenderness (over 1-2 cm superficial firm mass in RUQ, may be another one in the area). There is no guarding or rebound.    Neurological:      Mental Status: She is alert. Psychiatric:         Mood and Affect: Mood is anxious. Assessment:       Diagnosis Orders   1. RUQ pain  US ABDOMEN LIMITED   2. Right upper quadrant abdominal mass  US ABDOMEN LIMITED   3. Mesenteric lymphadenopathy  US ABDOMEN LIMITED   4. Need for vaccination  HPV Vaccine 9-valent IM   5. Migraine with aura and without status migrainosus, not intractable          Plan:     1-3 Maybe lymph nodes or cyst in the RUQ? Pt is still concerned about this tender area even though her abdominal CT was normal- Get abdominal US if not improving. 4. 2nd HPV vaccine. Pt said she gets the influenza vaccine at work. 5. Migraines- Have decreased in frequency on their own. May continue prn imitrex for migraines. Return in about 5 months (around 1/31/2021) for [place] for 3rd HPV vaccine. Orders Placed This Encounter   Procedures    US ABDOMEN LIMITED     Standing Status:   Future     Standing Expiration Date:   8/31/2021     Order Specific Question:   Reason for exam:     Answer:   Noticed tender abdominal masses mid July RUQ, recent CT showed RLQ abdominal lymphadenaopathy     Order Specific Question:   Specify organ? Answer:   LIVER     Order Specific Question:   Specify organ? Answer:   APPENDIX     Order Specific Question:   Specify organ? Answer:   GALLBLADDER     Order Specific Question:   Specify organ? Answer:   PANCREAS    HPV Vaccine 9-valent IM     Patient must lay down for 10 minutes after the injection     No orders of the defined types were placed in this encounter. Patient given educationalmaterials - see patient instructions. Discussed use, benefit, and side effectsof prescribed medications. All patient questions answered. Pt voiced understanding. Reviewed health maintenance. Instructed to continue current medications, diet andexercise. Patient agreed with treatment plan. Follow up as directed.      Electronicallysigned by Kamala Eckert PA-C

## 2020-09-08 ENCOUNTER — HOSPITAL ENCOUNTER (OUTPATIENT)
Dept: ULTRASOUND IMAGING | Facility: CLINIC | Age: 25
Discharge: HOME | End: 2020-09-10
Payer: COMMERCIAL

## 2020-09-08 PROCEDURE — 76705 ECHO EXAM OF ABDOMEN: CPT
